# Patient Record
Sex: FEMALE | Race: WHITE | Employment: FULL TIME | URBAN - METROPOLITAN AREA
[De-identification: names, ages, dates, MRNs, and addresses within clinical notes are randomized per-mention and may not be internally consistent; named-entity substitution may affect disease eponyms.]

---

## 2017-06-13 ENCOUNTER — OFFICE VISIT (OUTPATIENT)
Dept: OTOLARYNGOLOGY | Facility: CLINIC | Age: 37
End: 2017-06-13

## 2017-06-13 VITALS
SYSTOLIC BLOOD PRESSURE: 107 MMHG | BODY MASS INDEX: 25.86 KG/M2 | DIASTOLIC BLOOD PRESSURE: 71 MMHG | TEMPERATURE: 98 F | HEIGHT: 61 IN | WEIGHT: 137 LBS

## 2017-06-13 DIAGNOSIS — M26.69 TMJ INFLAMMATION: Primary | ICD-10-CM

## 2017-06-13 PROCEDURE — 99212 OFFICE O/P EST SF 10 MIN: CPT | Performed by: OTOLARYNGOLOGY

## 2017-06-13 PROCEDURE — 99243 OFF/OP CNSLTJ NEW/EST LOW 30: CPT | Performed by: OTOLARYNGOLOGY

## 2017-06-13 RX ORDER — AZELASTINE 1 MG/ML
SPRAY, METERED NASAL
COMMUNITY

## 2017-06-13 RX ORDER — LORAZEPAM 0.5 MG/1
TABLET ORAL
Refills: 0 | COMMUNITY
Start: 2017-04-13

## 2017-06-13 RX ORDER — MELOXICAM 15 MG/1
15 TABLET ORAL DAILY
Qty: 30 TABLET | Refills: 3 | Status: SHIPPED | OUTPATIENT
Start: 2017-06-13

## 2017-06-13 RX ORDER — IBUPROFEN 600 MG/1
600 TABLET ORAL
COMMUNITY

## 2017-06-13 NOTE — PROGRESS NOTES
Milagro Marmolejo is a 29year old female.   Patient presents with:  Ear Pain: left ear, pain radiates to left side of jaw and left temple       HISTORY OF PRESENT ILLNESS  She presents with a long history of chronically clenching her teeth to the point where and rash. Hema/Lymph Negative Easy bleeding and easy bruising.            PHYSICAL EXAM    /71 mmHg  Temp(Src) 98.4 °F (36.9 °C) (Tympanic)  Ht 5' 1\" (1.549 m)  Wt 137 lb (62.143 kg)  BMI 25.90 kg/m2       Constitutional Normal Overall appearance - PLAN    1. TMJ inflammation  This appears to be all TMJ related. She does clench both day and night. Start Mobic, warm heat and soft diet. I have recommended that she talk to her dentist about a bite guard.  We will also send her to a TMJ physiotherapist. Harmony Dupree

## 2017-06-26 ENCOUNTER — TELEPHONE (OUTPATIENT)
Dept: OTOLARYNGOLOGY | Facility: CLINIC | Age: 37
End: 2017-06-26

## 2017-06-26 NOTE — TELEPHONE ENCOUNTER
Pt wants a recommendation for physical therapist who is located in [facility] - please advise - thank you.

## 2017-06-26 NOTE — TELEPHONE ENCOUNTER
There are many PT programs that have offices throughout [facility]. I do not know of any in particular but she can look for [facility] as they have offices everywhere. She can go to a local hospital as well.

## 2017-06-27 NOTE — TELEPHONE ENCOUNTER
Pt informed she may check with her insurance company to find a PT who is in network and who is located in [facility] who specializes in TMJ therapy. Pt states she called [facility] to confirm they have a TMJ therapist at that location.

## 2017-08-28 ENCOUNTER — OFFICE VISIT (OUTPATIENT)
Dept: OTOLARYNGOLOGY | Facility: CLINIC | Age: 37
End: 2017-08-28

## 2017-08-28 VITALS
WEIGHT: 137 LBS | BODY MASS INDEX: 25.86 KG/M2 | HEIGHT: 61 IN | DIASTOLIC BLOOD PRESSURE: 72 MMHG | SYSTOLIC BLOOD PRESSURE: 106 MMHG | TEMPERATURE: 98 F

## 2017-08-28 DIAGNOSIS — M26.69 TMJ INFLAMMATION: Primary | ICD-10-CM

## 2017-08-28 PROCEDURE — 99214 OFFICE O/P EST MOD 30 MIN: CPT | Performed by: OTOLARYNGOLOGY

## 2017-08-28 PROCEDURE — 99212 OFFICE O/P EST SF 10 MIN: CPT | Performed by: OTOLARYNGOLOGY

## 2017-08-28 RX ORDER — SERTRALINE HYDROCHLORIDE 25 MG/1
TABLET, FILM COATED ORAL
Refills: 0 | COMMUNITY
Start: 2017-07-07 | End: 2020-07-17 | Stop reason: ALTCHOICE

## 2017-08-28 RX ORDER — LORAZEPAM 0.5 MG/1
TABLET ORAL
COMMUNITY
Start: 2017-06-16 | End: 2017-08-28

## 2017-08-28 RX ORDER — CYCLOBENZAPRINE HCL 5 MG
TABLET ORAL
Refills: 0 | COMMUNITY
Start: 2017-08-24

## 2017-08-28 RX ORDER — SERTRALINE HYDROCHLORIDE 25 MG/1
TABLET, FILM COATED ORAL
COMMUNITY
Start: 2017-07-07 | End: 2020-07-17 | Stop reason: ALTCHOICE

## 2017-08-29 NOTE — PROGRESS NOTES
Armand Buckley is a 40year old female. Patient presents with: Follow - Up: 2 month follow up- TMJ- per pt there is changes/improvement of symptoms.  per pt she had episodes of dizziness last week      HISTORY OF PRESENT ILLNESS  She presents with a long longer using these medications. Her primary care physician did recommend that she meet with a neurologist for her few episodes of imbalance.     Social History    Marital status:              Spouse name:                       Years of education: Right: Normal, Left: Normal. Fundus - Right: Normal, Left: Normal.   Neurological Normal Memory - Normal. Cranial nerves - Cranial nerves II through XII grossly intact.    Head/Face Normal Facial features - Normal. Eyebrows - Normal. Skull - Normal. medications for pain she is using warm heat soft diet and physical therapy seems to help significantly.   We did discuss the value of getting a bite guard and she states that her dentist has been somewhat unimpressed about her TMJ issues but she will approa

## 2018-06-22 ENCOUNTER — OFFICE VISIT (OUTPATIENT)
Dept: OTOLARYNGOLOGY | Facility: CLINIC | Age: 38
End: 2018-06-22

## 2018-06-22 VITALS
WEIGHT: 140 LBS | HEIGHT: 61 IN | DIASTOLIC BLOOD PRESSURE: 73 MMHG | TEMPERATURE: 98 F | BODY MASS INDEX: 26.43 KG/M2 | SYSTOLIC BLOOD PRESSURE: 107 MMHG

## 2018-06-22 DIAGNOSIS — R59.9: Primary | ICD-10-CM

## 2018-06-22 PROCEDURE — 99214 OFFICE O/P EST MOD 30 MIN: CPT | Performed by: OTOLARYNGOLOGY

## 2018-06-22 PROCEDURE — 99212 OFFICE O/P EST SF 10 MIN: CPT | Performed by: OTOLARYNGOLOGY

## 2018-06-22 RX ORDER — CLINDAMYCIN PHOSPHATE AND BENZOYL PEROXIDE 10; 50 MG/G; MG/G
GEL TOPICAL
COMMUNITY
Start: 2007-06-12

## 2018-06-22 RX ORDER — ADAPALENE 0.1 G/100G
CREAM TOPICAL
COMMUNITY
Start: 2007-06-12

## 2018-06-22 NOTE — PATIENT INSTRUCTIONS
Lymphadenopathy  Lymphadenopathy is swelling of the lymph nodes. Lymph nodes are small, bean-shaped glands around the body. What are lymph nodes? Lymph nodes are part of your immune system.  These glands are found in your neck, over your clavicle, armpi You may also have symptoms from an infection causing the swollen glands. These symptoms may include fever, sore throat, body aches, or cough. Diagnosing lymphadenopathy  Your healthcare provider will ask about your health history and symptoms.  He or she w © 5876-2303 [employer] [address]. [address] [facility], [facility] [place]. All rights reserved. This information is not intended as a substitute for professional medical care. Always follow your healthcare professional's instructions.         Lymphad Lymphadenopathy can cause symptoms such as:  · Lumps under the jaw, on the sides or back of the neck, in the armpits, in the groin, or in the chest or belly (abdomen)  · Pain or tenderness in any of these areas  · Redness or warmth in any of these areas  Y Call your healthcare provider if you have lymph nodes that are still swollen after 3 to 4 weeks, or as directed by your healthcare provider. Date Last Reviewed: 5/1/2017  © 8304-1263 [employer] [address]. [address] [facility] [place] [facility], [address] [place] [address] [place].  A

## 2018-06-22 NOTE — PROGRESS NOTES
Zelda Trevino is a 41year old female.   Patient presents with:  Lesion: behind left ear for several months      HISTORY OF PRESENT ILLNESS  She presents with a long history of chronically clenching her teeth to the point where she has fractured her molar neurologist for her few episodes of imbalance.     6/22/18 since I last saw her she has stopped using a bite guard.   She is no longer clenching and states that in general she is doing things to manage her TMJ issues which have really improved her ear pain/ Abdominal pain and diarrhea. Endocrine Negative Cold intolerance and heat intolerance. Neuro Negative Tremors. Psych Negative Anxiety and depression. Integumentary Negative Frequent skin infections, pigment change and rash.    Hema/Lymph Negative Ea Oral Tab, , Disp: , Rfl:   •  Cyclobenzaprine HCl 5 MG Oral Tab, TK 1 TO 2 TS PO UP TO TID, Disp: , Rfl: 0  •  Sertraline HCl 25 MG Oral Tab, , Disp: , Rfl: 0  •  Sertraline HCl 50 MG Oral Tab, TK 1 T PO D, Disp: , Rfl: 0  •  Azelastine HCl 0.1 % Nasal Sol

## 2018-12-25 ENCOUNTER — HOSPITAL ENCOUNTER (OUTPATIENT)
Age: 38
Discharge: HOME | End: 2018-12-25
Attending: EMERGENCY MEDICINE
Payer: COMMERCIAL

## 2018-12-25 VITALS
DIASTOLIC BLOOD PRESSURE: 72 MMHG | OXYGEN SATURATION: 97 % | RESPIRATION RATE: 22 BRPM | SYSTOLIC BLOOD PRESSURE: 128 MMHG | TEMPERATURE: 98 F | HEART RATE: 81 BPM

## 2018-12-25 DIAGNOSIS — H65.92 LEFT OTITIS MEDIA WITH EFFUSION: Primary | ICD-10-CM

## 2018-12-25 PROCEDURE — 99204 OFFICE O/P NEW MOD 45 MIN: CPT

## 2018-12-25 PROCEDURE — 99213 OFFICE O/P EST LOW 20 MIN: CPT

## 2018-12-25 RX ORDER — AZITHROMYCIN 250 MG/1
TABLET, FILM COATED ORAL
Qty: 1 PACKAGE | Refills: 0 | Status: SHIPPED | OUTPATIENT
Start: 2018-12-25 | End: 2020-07-17 | Stop reason: ALTCHOICE

## 2018-12-25 NOTE — ED INITIAL ASSESSMENT (HPI)
Pt here with complaints of left ear pain, pt states she developed a cold and sore throat on Friday, sore thorat has gone away but now developed left ear pain yesterday, pt denies any fevers at this time

## 2018-12-25 NOTE — ED NOTES
Pt discharged home,perscription given to patient , pt instructed to follow up with her primary md if symptoms do not improve

## 2018-12-25 NOTE — ED PROVIDER NOTES
Patient Seen in: [address]    History   Patient presents with:  Ear Problem Pain (neurosensory)    Stated Complaint: Cold, and pain in left ear     HPI    The patient is a 27-year-old female with no significant past med tenderness  Cardiovascular: Regular rate and rhythm without murmur  Abdomen: Soft, nontender and nondistended  Neurologic: Patient is awake, alert and oriented ×3.   The patient's motor strength is 5 out of 5 and symmetric in the upper and lower extremities

## 2020-07-17 ENCOUNTER — OFFICE VISIT (OUTPATIENT)
Dept: OTOLARYNGOLOGY | Facility: CLINIC | Age: 40
End: 2020-07-17
Payer: COMMERCIAL

## 2020-07-17 VITALS
WEIGHT: 140 LBS | BODY MASS INDEX: 26.43 KG/M2 | DIASTOLIC BLOOD PRESSURE: 82 MMHG | SYSTOLIC BLOOD PRESSURE: 120 MMHG | HEIGHT: 61 IN

## 2020-07-17 DIAGNOSIS — M26.622 ARTHRALGIA OF LEFT TEMPOROMANDIBULAR JOINT: Primary | ICD-10-CM

## 2020-07-17 DIAGNOSIS — J34.2 DEVIATED NASAL SEPTUM: ICD-10-CM

## 2020-07-17 PROCEDURE — 3079F DIAST BP 80-89 MM HG: CPT | Performed by: OTOLARYNGOLOGY

## 2020-07-17 PROCEDURE — 99213 OFFICE O/P EST LOW 20 MIN: CPT | Performed by: OTOLARYNGOLOGY

## 2020-07-17 PROCEDURE — 3074F SYST BP LT 130 MM HG: CPT | Performed by: OTOLARYNGOLOGY

## 2020-07-17 PROCEDURE — 3008F BODY MASS INDEX DOCD: CPT | Performed by: OTOLARYNGOLOGY

## 2020-07-17 RX ORDER — DOXEPIN HYDROCHLORIDE 10 MG/1
CAPSULE ORAL
COMMUNITY

## 2020-07-17 RX ORDER — MONTELUKAST SODIUM 10 MG/1
10 TABLET ORAL NIGHTLY
Qty: 30 TABLET | Refills: 3 | Status: SHIPPED | OUTPATIENT
Start: 2020-07-17

## 2020-07-17 RX ORDER — FLUTICASONE PROPIONATE 50 MCG
SPRAY, SUSPENSION (ML) NASAL
COMMUNITY

## 2020-07-17 RX ORDER — SUMATRIPTAN 100 MG/1
TABLET, FILM COATED ORAL
COMMUNITY

## 2020-07-17 RX ORDER — ALBUTEROL SULFATE 90 UG/1
AEROSOL, METERED RESPIRATORY (INHALATION)
COMMUNITY

## 2020-07-17 RX ORDER — PREDNISONE 20 MG/1
TABLET ORAL
COMMUNITY
End: 2020-07-17 | Stop reason: ALTCHOICE

## 2020-07-17 NOTE — PROGRESS NOTES
Glenn Horan is a 45year old female.   Patient presents with:  Sinus Problem: facial pain left side of face for a couple months, popping of left ear, watery left eye  Nose Problem: couple of nosebleeds from left nostril with occasional clots about 1 primary care physician for her at stress and anxiety.  She is no longer using these medications. Evelio Wilkinson primary care physician did recommend that she meet with a neurologist for her few episodes of imbalance.     6/22/18 since I last saw her she has stopped Former Smoker        Years: 1.00      Smokeless tobacco: Never Used    Substance and Sexual Activity      Alcohol use: Yes      Drug use: Never      Family History   Problem Relation Age of Onset   • Cancer Maternal Grandmother    • Cancer Other    • Diabe Normal. Canal - Right: Normal, Left: Normal. TM - Right: Normal, Left: Normal.   Skin Normal Inspection - Normal.        Lymph Detail Normal Submental. Submandibular. Anterior cervical. Posterior cervical. Supraclavicular.    TMJ   tender to palpation on th 2 GRAMS EXT AA TID, Disp: , Rfl: 1  •  Meloxicam 15 MG Oral Tab, Take 1 tablet (15 mg total) by mouth daily. (Patient not taking: Reported on 7/17/2020 ), Disp: 30 tablet, Rfl: 3  ASSESSMENT AND PLAN    1. Arthralgia of left temporomandibular joint    2.  D

## 2020-08-24 ENCOUNTER — OFFICE VISIT (OUTPATIENT)
Dept: OTOLARYNGOLOGY | Facility: CLINIC | Age: 40
End: 2020-08-24
Payer: COMMERCIAL

## 2020-08-24 VITALS
BODY MASS INDEX: 26.43 KG/M2 | WEIGHT: 140 LBS | DIASTOLIC BLOOD PRESSURE: 72 MMHG | HEIGHT: 61 IN | SYSTOLIC BLOOD PRESSURE: 112 MMHG

## 2020-08-24 DIAGNOSIS — J34.2 DEVIATED NASAL SEPTUM: Primary | ICD-10-CM

## 2020-08-24 DIAGNOSIS — M26.622 ARTHRALGIA OF LEFT TEMPOROMANDIBULAR JOINT: ICD-10-CM

## 2020-08-24 PROCEDURE — 3074F SYST BP LT 130 MM HG: CPT | Performed by: OTOLARYNGOLOGY

## 2020-08-24 PROCEDURE — 3008F BODY MASS INDEX DOCD: CPT | Performed by: OTOLARYNGOLOGY

## 2020-08-24 PROCEDURE — 99213 OFFICE O/P EST LOW 20 MIN: CPT | Performed by: OTOLARYNGOLOGY

## 2020-08-24 PROCEDURE — 3078F DIAST BP <80 MM HG: CPT | Performed by: OTOLARYNGOLOGY

## 2020-08-24 RX ORDER — AZELASTINE 1 MG/ML
2 SPRAY, METERED NASAL 2 TIMES DAILY
Qty: 1 BOTTLE | Refills: 3 | Status: SHIPPED | OUTPATIENT
Start: 2020-08-24

## 2020-08-24 NOTE — PROGRESS NOTES
Jose Salcido is a 45year old female. Patient presents with:   Follow - Up: regarding deviated nasal septum, pt tried singulair and loratadine d for a few days and stopped due to side effects, pt states improvement in symptoms while she was taking t her primary care physician for her at stress and anxiety.  She is no longer using these medications. Luzma Russell primary care physician did recommend that she meet with a neurologist for her few episodes of imbalance.     6/22/18 since I last saw her she has stop jittery. Singulair made her somewhat anxious so she will use it for 2 or 3 days at the most.  Really seem to help though. Currently using fluticasone twice daily.   Not having any real significant TMJ issues and does complain of pressure over her left isacc Frequent skin infections, pigment change and rash. Hema/Lymph Negative Easy bleeding and easy bruising.            PHYSICAL EXAM    /72   Ht 5' 1\" (1.549 m)   Wt 140 lb (63.5 kg)   BMI 26.45 kg/m²        Constitutional Normal Overall appearance - N (15 mg total) by mouth daily. , Disp: 30 tablet, Rfl: 3  •  Doxepin HCl 10 MG Oral Cap, doxepin 10 mg capsule  1 tab po qhs, Disp: , Rfl:   •  Fluticasone Propionate HFA (FLOVENT HFA) 220 MCG/ACT Inhalation Aerosol, Flovent  mcg/actuation aerosol inh

## 2021-06-30 ENCOUNTER — HOSPITAL ENCOUNTER (OUTPATIENT)
Age: 41
Discharge: HOME | End: 2021-06-30
Payer: COMMERCIAL

## 2021-06-30 VITALS
HEART RATE: 71 BPM | BODY MASS INDEX: 24.55 KG/M2 | DIASTOLIC BLOOD PRESSURE: 71 MMHG | WEIGHT: 130 LBS | TEMPERATURE: 98 F | OXYGEN SATURATION: 100 % | SYSTOLIC BLOOD PRESSURE: 118 MMHG | HEIGHT: 61 IN | RESPIRATION RATE: 16 BRPM

## 2021-06-30 DIAGNOSIS — R21 RASH: ICD-10-CM

## 2021-06-30 DIAGNOSIS — H65.192 ACUTE EFFUSION OF LEFT EAR: ICD-10-CM

## 2021-06-30 DIAGNOSIS — R59.9 SWELLING OF LYMPH NODE: Primary | ICD-10-CM

## 2021-06-30 PROCEDURE — 99204 OFFICE O/P NEW MOD 45 MIN: CPT | Performed by: EMERGENCY MEDICINE

## 2021-06-30 RX ORDER — VALACYCLOVIR HYDROCHLORIDE 1 G/1
1 TABLET, FILM COATED ORAL 3 TIMES DAILY
Qty: 21 TABLET | Refills: 0 | Status: SHIPPED | OUTPATIENT
Start: 2021-06-30 | End: 2021-07-07

## 2021-06-30 NOTE — ED INITIAL ASSESSMENT (HPI)
Pt presents to the IC with c/o a swollen lymph node behind her left ear, noticed yesterday. Pt denies pain, ear or throat discomfort. No fevers. No other complaints.

## 2021-06-30 NOTE — ED PROVIDER NOTES
Patient Seen in: [facility]      History   Patient presents with:  Ear Problem Pain    Stated Complaint: Swelling behine left ear    HPI/Subjective:   Myesha Rodriguez is a 37year old female here for possible lymph swelling behind her le Eyes:      Conjunctiva/sclera: Conjunctivae normal.      Pupils: Pupils are equal, round, and reactive to light. Pulmonary:      Effort: Pulmonary effort is normal. No respiratory distress. Musculoskeletal:      Cervical back: Normal range of motion. questions answered. No acute distress and cleared for home.     Disposition and Plan     Clinical Impression:  Swelling of lymph node  (primary encounter diagnosis)  Acute effusion of left ear  Rash     Disposition:  Discharge  6/30/2021  9:13 am    Follow-

## 2024-04-10 ENCOUNTER — HOSPITAL ENCOUNTER (OUTPATIENT)
Age: 44
Discharge: HOME | End: 2024-04-10
Payer: COMMERCIAL

## 2024-04-10 VITALS
HEART RATE: 99 BPM | RESPIRATION RATE: 20 BRPM | DIASTOLIC BLOOD PRESSURE: 62 MMHG | SYSTOLIC BLOOD PRESSURE: 121 MMHG | OXYGEN SATURATION: 100 % | TEMPERATURE: 99 F

## 2024-04-10 DIAGNOSIS — Z11.52 ENCOUNTER FOR PREOPERATIVE SCREENING LABORATORY TESTING FOR COVID-19 VIRUS: ICD-10-CM

## 2024-04-10 DIAGNOSIS — R50.9 FEVER: ICD-10-CM

## 2024-04-10 DIAGNOSIS — Z01.812 ENCOUNTER FOR PREOPERATIVE SCREENING LABORATORY TESTING FOR COVID-19 VIRUS: ICD-10-CM

## 2024-04-10 DIAGNOSIS — J11.1 INFLUENZA: Primary | ICD-10-CM

## 2024-04-10 DIAGNOSIS — J45.21 MILD INTERMITTENT ASTHMA WITH EXACERBATION (HCC): ICD-10-CM

## 2024-04-10 LAB
POCT INFLUENZA A: NEGATIVE
POCT INFLUENZA B: POSITIVE
SARS-COV-2 RNA RESP QL NAA+PROBE: NOT DETECTED

## 2024-04-10 PROCEDURE — 87502 INFLUENZA DNA AMP PROBE: CPT | Performed by: NURSE PRACTITIONER

## 2024-04-10 PROCEDURE — U0002 COVID-19 LAB TEST NON-CDC: HCPCS | Performed by: NURSE PRACTITIONER

## 2024-04-10 PROCEDURE — 99213 OFFICE O/P EST LOW 20 MIN: CPT | Performed by: NURSE PRACTITIONER

## 2024-04-10 RX ORDER — BENZONATATE 200 MG/1
200 CAPSULE ORAL 3 TIMES DAILY PRN
Qty: 15 CAPSULE | Refills: 0 | Status: SHIPPED | OUTPATIENT
Start: 2024-04-10

## 2024-04-10 RX ORDER — CETIRIZINE HYDROCHLORIDE 10 MG/1
TABLET ORAL
COMMUNITY

## 2024-04-10 RX ORDER — PREDNISONE 20 MG/1
20 TABLET ORAL DAILY
Qty: 5 TABLET | Refills: 0 | Status: SHIPPED | OUTPATIENT
Start: 2024-04-10 | End: 2024-04-15

## 2024-04-11 NOTE — ED INITIAL ASSESSMENT (HPI)
Pt here with complaints of cough body aches, wheezing , chest tightness and fevers that began 4 days ago, pt denies any sob

## 2024-04-11 NOTE — ED PROVIDER NOTES
Patient Seen in: [facility] [place] [facility]      History     Chief Complaint   Patient presents with    Cough/URI    Fever     Stated Complaint: cough fever congestion    Subjective:   HPI  Patient is an 43-year-old female that presents to the immediate care center today with concern for fever, chills, cough, congestion that started 4 days ago.  She feels this is affecting her asthma she has been wheezing the last couple of days.  Pt denies  known illness exposure.  Pt. has been eating and drinking without difficulty.  She has had no shortness of air; no abdominal or back pain; no dizziness.        Objective:   Past Medical History:    Asthma (HCC)    Depression    Hives due to heat exposure              Past Surgical History:   Procedure Laterality Date    Anesth,surgery of shoulder Right                 Social History     Socioeconomic History    Marital status:    Tobacco Use    Smoking status: Former     Types: Cigarettes    Smokeless tobacco: Never   Vaping Use    Vaping status: Never Used   Substance and Sexual Activity    Alcohol use: Yes    Drug use: Never     Social Determinants of Health      Received from [facility]    Social Connections    Received from [facility]    Housing Stability              Review of Systems    Positive for stated complaint: cough fever congestion  Other systems are as noted in HPI.  Constitutional and vital signs reviewed.      All other systems reviewed and negative except as noted above.    Physical Exam     ED Triage Vitals [04/10/24 1855]   /62   Pulse 99   Resp 20   Temp 98.6 °F (37 °C)   Temp src Temporal   SpO2 100 %   O2 Device None (Room air)       Current:/62   Pulse 99   Temp 98.6 °F (37 °C) (Temporal)   Resp 20   LMP 03/12/2024   SpO2 100%         Physical Exam  Vitals and nursing note reviewed.   Constitutional:       General: She is not in acute distress.     Appearance: She is ill-appearing.   HENT:       Nose: Congestion present.   Eyes:      Conjunctiva/sclera: Conjunctivae normal.   Pulmonary:      Effort: Pulmonary effort is normal. No respiratory distress.      Breath sounds: Normal breath sounds. No wheezing.   Musculoskeletal:      Cervical back: Normal range of motion and neck supple.   Skin:     General: Skin is warm and dry.      Findings: No rash.   Neurological:      Mental Status: She is alert and oriented to person, place, and time.               ED Course     Labs Reviewed   POCT FLU TEST - Abnormal; Notable for the following components:       Result Value    POCT INFLUENZA B Positive (*)     All other components within normal limits    Narrative:     This assay is a rapid molecular in vitro test utilizing nucleic acid amplification of influenza A and B viral RNA.   RAPID SARS-COV-2 BY PCR - Normal                      MDM      Patient was given 20 mg dose of prednisone as she states she has been insensitive to the medication in the past and requested a lower than standard dose.    He has sufficient quantity of albuterol at home, declined new prescription today.                               Medical Decision Making  Differential diagnoses considered included, but are not exclusive of: bacterial vs viral sinusitis, dehydration, pneumonia, influenza, Covid-19 infection, and other viral upper respiratory infection.       Problems Addressed:  Influenza: self-limited or minor problem  Mild intermittent asthma with exacerbation (HCC): self-limited or minor problem    Risk  OTC drugs.  Prescription drug management.        Disposition and Plan     Clinical Impression:  1. Influenza    2. Fever    3. Encounter for preoperative screening laboratory testing for COVID-19 virus    4. Mild intermittent asthma with exacerbation (HCC)         Disposition:  Discharge  4/10/2024  7:27 pm    Follow-up:  Ximena Arredondo DO  [address] [place] [id number]  [phone]    Schedule an appointment as soon  as possible for a visit in 1 week  As needed          Medications Prescribed:  Discharge Medication List as of 4/10/2024  7:28 PM        START taking these medications    Details   predniSONE 20 MG Oral Tab Take 1 tablet (20 mg total) by mouth daily for 5 days., Normal, Disp-5 tablet, R-0      benzonatate 200 MG Oral Cap Take 1 capsule (200 mg total) by mouth 3 (three) times daily as needed for cough., Normal, Disp-15 capsule, R-0

## (undated) NOTE — MR AVS SNAPSHOT
[address]  [phone]               Thank you for choosing us for your health care visit with Corry Coreas.  Cielo Pereira MD.  We are glad to serve you and happy to provide you with this summar [address] [facility]   [address], [address]    [place] [id number] [place] [id number] [facility]  [address]  [facility],  [address] [place] [address]   [address], [address] [facility] Oscar information, go to [email or web address] and click on the Sign Up Now link in the [employer] box. Enter your [employer] Activation Code exactly as it appears below along with your Zip Code and Date of Birth to complete the sign-up process.  If you do You don’t need to join a gym. Home exercises work great.  Put more priority on exercise in your life                    Visit [facility] online at  [email or web address]

## (undated) NOTE — Clinical Note
Tari Sol,   [address] [facility] [address], [place]       06/13/2017        Patient: Justino Levi   YOB: 1980   Date of Visit: 6/13/2017       Dear  Dr. Reuben Bangura,      Thank you for referring Justino Levi to my practice.